# Patient Record
Sex: FEMALE | Race: WHITE
[De-identification: names, ages, dates, MRNs, and addresses within clinical notes are randomized per-mention and may not be internally consistent; named-entity substitution may affect disease eponyms.]

---

## 2020-01-04 ENCOUNTER — HOSPITAL ENCOUNTER (EMERGENCY)
Dept: HOSPITAL 47 - EC | Age: 35
LOS: 1 days | Discharge: HOME | End: 2020-01-05
Payer: COMMERCIAL

## 2020-01-04 VITALS — TEMPERATURE: 98.1 F

## 2020-01-04 DIAGNOSIS — R11.0: ICD-10-CM

## 2020-01-04 DIAGNOSIS — R10.9: ICD-10-CM

## 2020-01-04 DIAGNOSIS — M54.9: ICD-10-CM

## 2020-01-04 DIAGNOSIS — Z91.041: ICD-10-CM

## 2020-01-04 DIAGNOSIS — R74.0: Primary | ICD-10-CM

## 2020-01-04 DIAGNOSIS — Z91.048: ICD-10-CM

## 2020-01-04 DIAGNOSIS — K76.89: ICD-10-CM

## 2020-01-04 DIAGNOSIS — R82.4: ICD-10-CM

## 2020-01-04 LAB
ALBUMIN SERPL-MCNC: 4.3 G/DL (ref 3.5–5)
ALP SERPL-CCNC: 85 U/L (ref 38–126)
ALT SERPL-CCNC: 281 U/L (ref 4–34)
AMYLASE SERPL-CCNC: 45 U/L (ref 30–110)
ANION GAP SERPL CALC-SCNC: 8 MMOL/L
AST SERPL-CCNC: 510 U/L (ref 14–36)
BASOPHILS # BLD AUTO: 0 K/UL (ref 0–0.2)
BASOPHILS NFR BLD AUTO: 0 %
BUN SERPL-SCNC: 17 MG/DL (ref 7–17)
CALCIUM SPEC-MCNC: 9 MG/DL (ref 8.4–10.2)
CHLORIDE SERPL-SCNC: 107 MMOL/L (ref 98–107)
CO2 SERPL-SCNC: 22 MMOL/L (ref 22–30)
EOSINOPHIL # BLD AUTO: 0.1 K/UL (ref 0–0.7)
EOSINOPHIL NFR BLD AUTO: 1 %
ERYTHROCYTE [DISTWIDTH] IN BLOOD BY AUTOMATED COUNT: 4.76 M/UL (ref 3.8–5.4)
ERYTHROCYTE [DISTWIDTH] IN BLOOD: 12 % (ref 11.5–15.5)
GLUCOSE SERPL-MCNC: 142 MG/DL (ref 74–99)
HCT VFR BLD AUTO: 42.8 % (ref 34–46)
HGB BLD-MCNC: 14 GM/DL (ref 11.4–16)
LYMPHOCYTES # SPEC AUTO: 0.4 K/UL (ref 1–4.8)
LYMPHOCYTES NFR SPEC AUTO: 4 %
MCH RBC QN AUTO: 29.4 PG (ref 25–35)
MCHC RBC AUTO-ENTMCNC: 32.7 G/DL (ref 31–37)
MCV RBC AUTO: 90 FL (ref 80–100)
MONOCYTES # BLD AUTO: 0.3 K/UL (ref 0–1)
MONOCYTES NFR BLD AUTO: 3 %
NEUTROPHILS # BLD AUTO: 9.5 K/UL (ref 1.3–7.7)
NEUTROPHILS NFR BLD AUTO: 92 %
PH UR: 5.5 [PH] (ref 5–8)
PLATELET # BLD AUTO: 201 K/UL (ref 150–450)
POTASSIUM SERPL-SCNC: 4 MMOL/L (ref 3.5–5.1)
PROT SERPL-MCNC: 6.9 G/DL (ref 6.3–8.2)
SODIUM SERPL-SCNC: 137 MMOL/L (ref 137–145)
SP GR UR: 1.02 (ref 1–1.03)
UROBILINOGEN UR QL STRIP: <2 MG/DL (ref ?–2)
WBC # BLD AUTO: 10.4 K/UL (ref 3.8–10.6)

## 2020-01-04 PROCEDURE — 96375 TX/PRO/DX INJ NEW DRUG ADDON: CPT

## 2020-01-04 PROCEDURE — 85025 COMPLETE CBC W/AUTO DIFF WBC: CPT

## 2020-01-04 PROCEDURE — 80074 ACUTE HEPATITIS PANEL: CPT

## 2020-01-04 PROCEDURE — 81003 URINALYSIS AUTO W/O SCOPE: CPT

## 2020-01-04 PROCEDURE — 80053 COMPREHEN METABOLIC PANEL: CPT

## 2020-01-04 PROCEDURE — 82150 ASSAY OF AMYLASE: CPT

## 2020-01-04 PROCEDURE — 99284 EMERGENCY DEPT VISIT MOD MDM: CPT

## 2020-01-04 PROCEDURE — 83690 ASSAY OF LIPASE: CPT

## 2020-01-04 PROCEDURE — 96361 HYDRATE IV INFUSION ADD-ON: CPT

## 2020-01-04 PROCEDURE — 81025 URINE PREGNANCY TEST: CPT

## 2020-01-04 PROCEDURE — 36415 COLL VENOUS BLD VENIPUNCTURE: CPT

## 2020-01-04 PROCEDURE — 96374 THER/PROPH/DIAG INJ IV PUSH: CPT

## 2020-01-04 PROCEDURE — 74177 CT ABD & PELVIS W/CONTRAST: CPT

## 2020-01-05 VITALS — RESPIRATION RATE: 18 BRPM | HEART RATE: 77 BPM | SYSTOLIC BLOOD PRESSURE: 116 MMHG | DIASTOLIC BLOOD PRESSURE: 79 MMHG

## 2020-01-05 LAB — HEPATITIS A ANTIBODY IGM: NEGATIVE

## 2020-01-05 NOTE — ED
General Adult HPI





- General


Chief complaint: Abdominal Pain


Stated complaint: Abd pain


Time Seen by Provider: 01/04/20 22:20


Source: patient, RN notes reviewed


Mode of arrival: ambulatory


Limitations: no limitations





- History of Present Illness


Initial comments: 





34-year-old female with a past medical history of migraines, back pain presents 

to the emergency department for a chief complaint of abdominal pain.  Patient 

has mid abdominal pain radiating to her back.  This started a few hours prior to

arrival.  Patient was sitting on the couch when she suddenly started to have 

this pain.  She states she did have diarrhea yesterday.  Patient has also had 

upper respiratory virus.  She denies fevers or chills.  She admits to nausea 

denies vomiting. Patient has no other complaints at this time including 

shortness of breath, chest pain, headache, or visual changes.





- Related Data


                                    Allergies











Allergy/AdvReac Type Severity Reaction Status Date / Time


 


povidone-iodine Allergy  Rash/Hives Verified 01/04/20 21:45





[From Betadine]     


 


soap [From Betadine] Allergy  Rash/Hives Verified 01/04/20 21:45














Review of Systems


ROS Statement: 


Those systems with pertinent positive or pertinent negative responses have been 

documented in the HPI.





ROS Other: All systems not noted in ROS Statement are negative.





Past Medical History


Past Medical History: No Reported History


Additional Past Medical History / Comment(s): migraines, back pain


History of Any Multi-Drug Resistant Organisms: None Reported


Past Surgical History: Tubal Ligation


Past Psychological History: No Psychological Hx Reported


Smoking Status: Never smoker


Past Alcohol Use History: Occasional


Past Drug Use History: None Reported





General Exam


Limitations: no limitations


General appearance: alert, in no apparent distress


Head exam: Present: atraumatic, normocephalic, normal inspection


Eye exam: Present: normal appearance, PERRL, EOMI.  Absent: scleral icterus, 

conjunctival injection, periorbital swelling


ENT exam: Present: normal exam, mucous membranes moist


Neck exam: Present: normal inspection.  Absent: tenderness, meningismus, 

lymphadenopathy


Respiratory exam: Present: normal lung sounds bilaterally.  Absent: respiratory 

distress, wheezes, rales, rhonchi, stridor


Cardiovascular Exam: Present: regular rate, normal rhythm, normal heart sounds. 

Absent: systolic murmur, diastolic murmur, rubs, gallop, clicks


GI/Abdominal exam: Present: soft, normal bowel sounds.  Absent: distended, 

tenderness, guarding, rebound, rigid


Back exam: Absent: CVA tenderness (R), CVA tenderness (L)





Course





                                   Vital Signs











  01/04/20





  21:40


 


Temperature 98.1 F


 


Pulse Rate 72


 


Respiratory 20





Rate 


 


Blood Pressure 135/77


 


O2 Sat by Pulse 100





Oximetry 














Medical Decision Making





- Medical Decision Making





Vitals are stable.  CBC unremarkable.  CMP does show mild transaminitis.  

Urinalysis shows trace ketones.  Patient's abdomen is nontender however given 

the degree of pain CT was ordered.  This does show hepatic periportal edema that

is nonspecific and could relate hepatitis.  Acute hepatitis panel was ordered.  

Patient's pain is much better at this time.  At this time patient will be di

scharged home to follow up with primary care on Monday for repeat liver enzymes.

 However I did discuss strict return parameters with patient denies pain is 

intolerable she has severe nausea vomiting she needs to return to the ER.





- Lab Data


Result diagrams: 


                                 01/04/20 23:00





                                 01/04/20 23:00





                                   Lab Results











  01/04/20 01/04/20 01/04/20 Range/Units





  21:44 21:44 23:00 


 


WBC     (3.8-10.6)  k/uL


 


RBC     (3.80-5.40)  m/uL


 


Hgb     (11.4-16.0)  gm/dL


 


Hct     (34.0-46.0)  %


 


MCV     (80.0-100.0)  fL


 


MCH     (25.0-35.0)  pg


 


MCHC     (31.0-37.0)  g/dL


 


RDW     (11.5-15.5)  %


 


Plt Count     (150-450)  k/uL


 


Neutrophils %     %


 


Lymphocytes %     %


 


Monocytes %     %


 


Eosinophils %     %


 


Basophils %     %


 


Neutrophils #     (1.3-7.7)  k/uL


 


Lymphocytes #     (1.0-4.8)  k/uL


 


Monocytes #     (0-1.0)  k/uL


 


Eosinophils #     (0-0.7)  k/uL


 


Basophils #     (0-0.2)  k/uL


 


Sodium    137  (137-145)  mmol/L


 


Potassium    4.0  (3.5-5.1)  mmol/L


 


Chloride    107  ()  mmol/L


 


Carbon Dioxide    22  (22-30)  mmol/L


 


Anion Gap    8  mmol/L


 


BUN    17  (7-17)  mg/dL


 


Creatinine    0.56  (0.52-1.04)  mg/dL


 


Est GFR (CKD-EPI)AfAm    >90  (>60 ml/min/1.73 sqM)  


 


Est GFR (CKD-EPI)NonAf    >90  (>60 ml/min/1.73 sqM)  


 


Glucose    142 H  (74-99)  mg/dL


 


Calcium    9.0  (8.4-10.2)  mg/dL


 


Total Bilirubin    0.5  (0.2-1.3)  mg/dL


 


AST    510 H  (14-36)  U/L


 


ALT    281 H  (4-34)  U/L


 


Alkaline Phosphatase    85  ()  U/L


 


Total Protein    6.9  (6.3-8.2)  g/dL


 


Albumin    4.3  (3.5-5.0)  g/dL


 


Amylase    45  ()  U/L


 


Lipase    114  ()  U/L


 


Urine Color   Yellow   


 


Urine Appearance   Clear   (Clear)  


 


Urine pH   5.5   (5.0-8.0)  


 


Ur Specific Gravity   1.025   (1.001-1.035)  


 


Urine Protein   Negative   (Negative)  


 


Urine Glucose (UA)   Negative   (Negative)  


 


Urine Ketones   Trace H   (Negative)  


 


Urine Blood   Negative   (Negative)  


 


Urine Nitrite   Negative   (Negative)  


 


Urine Bilirubin   Negative   (Negative)  


 


Urine Urobilinogen   <2.0   (<2.0)  mg/dL


 


Ur Leukocyte Esterase   Negative   (Negative)  


 


Urine HCG, Qual  Not Detected    (Not Detectd)  














  01/04/20 Range/Units





  23:00 


 


WBC  10.4  (3.8-10.6)  k/uL


 


RBC  4.76  (3.80-5.40)  m/uL


 


Hgb  14.0  (11.4-16.0)  gm/dL


 


Hct  42.8  (34.0-46.0)  %


 


MCV  90.0  (80.0-100.0)  fL


 


MCH  29.4  (25.0-35.0)  pg


 


MCHC  32.7  (31.0-37.0)  g/dL


 


RDW  12.0  (11.5-15.5)  %


 


Plt Count  201  (150-450)  k/uL


 


Neutrophils %  92  %


 


Lymphocytes %  4  %


 


Monocytes %  3  %


 


Eosinophils %  1  %


 


Basophils %  0  %


 


Neutrophils #  9.5 H  (1.3-7.7)  k/uL


 


Lymphocytes #  0.4 L  (1.0-4.8)  k/uL


 


Monocytes #  0.3  (0-1.0)  k/uL


 


Eosinophils #  0.1  (0-0.7)  k/uL


 


Basophils #  0.0  (0-0.2)  k/uL


 


Sodium   (137-145)  mmol/L


 


Potassium   (3.5-5.1)  mmol/L


 


Chloride   ()  mmol/L


 


Carbon Dioxide   (22-30)  mmol/L


 


Anion Gap   mmol/L


 


BUN   (7-17)  mg/dL


 


Creatinine   (0.52-1.04)  mg/dL


 


Est GFR (CKD-EPI)AfAm   (>60 ml/min/1.73 sqM)  


 


Est GFR (CKD-EPI)NonAf   (>60 ml/min/1.73 sqM)  


 


Glucose   (74-99)  mg/dL


 


Calcium   (8.4-10.2)  mg/dL


 


Total Bilirubin   (0.2-1.3)  mg/dL


 


AST   (14-36)  U/L


 


ALT   (4-34)  U/L


 


Alkaline Phosphatase   ()  U/L


 


Total Protein   (6.3-8.2)  g/dL


 


Albumin   (3.5-5.0)  g/dL


 


Amylase   ()  U/L


 


Lipase   ()  U/L


 


Urine Color   


 


Urine Appearance   (Clear)  


 


Urine pH   (5.0-8.0)  


 


Ur Specific Gravity   (1.001-1.035)  


 


Urine Protein   (Negative)  


 


Urine Glucose (UA)   (Negative)  


 


Urine Ketones   (Negative)  


 


Urine Blood   (Negative)  


 


Urine Nitrite   (Negative)  


 


Urine Bilirubin   (Negative)  


 


Urine Urobilinogen   (<2.0)  mg/dL


 


Ur Leukocyte Esterase   (Negative)  


 


Urine HCG, Qual   (Not Detectd)  














Disposition


Clinical Impression: 


 Transaminitis





Disposition: HOME SELF-CARE


Condition: Good


Instructions (If sedation given, give patient instructions):  Abdominal Pain 

(ED)


Additional Instructions: 


Please drink plenty of fluids.  Take Motrin instead of Tylenol for pain.  

Follow-up with primary care on Monday as you'll need repeat liver enzymes.  If 

you have worsening symptoms over the weekend including worsening pain or 

significant nausea vomiting please return to the emergency department


Is patient prescribed a controlled substance at d/c from ED?: No


Referrals: 


Danial Marc MD [Primary Care Provider] - 1-2 days


Time of Disposition: 00:37

## 2020-01-05 NOTE — CT
EXAMINATION TYPE: CT abdomen pelvis w con

 

DATE OF EXAM: 1/4/2020

 

COMPARISON:

 

HISTORY: Patient presents with abdominal pain.

 

CT DLP: 986.6 mGycm

Automated exposure control for dose reduction was used.

 

CONTRAST: 

Performed with IV Contrast, patient injected with 100mL mL of Isovue 300.

Multiple axial sections were obtained from the diaphragm to the floor the pelvis with intravenous con
trast.

 

FINDINGS:

There is periportal edema. Lung bases are clear. There is no pleural effusion. Heart size is normal.

 

Liver shows no focal defect. Bile ducts are not dilated. Gallbladder appears normal. Spleen stomach p
ancreas appear normal. There is no adrenal mass.

 

Kidneys show satisfactory contrast opacification. There is no hydronephrosis. Ureters are not dilated
.

 

Bladder distends smoothly. Uterus is retroverted. There is small amount of fluid in the pelvis. Appen
elder is posterior and appears normal.

 

There are bilateral ovarian cysts that measure up to 2 cm.

 

There is no mesenteric edema. There is no ascites or free air. There is no sign of a bowel obstructio
n. Lumbar spine is intact. Bony pelvis is intact.

 

IMPRESSION:

There is hepatic periportal edema that is nonspecific and could relate to hepatitis.

 

No dilated ducts. Mild free fluid in the pelvis. Normal appendix.